# Patient Record
Sex: FEMALE | Race: ASIAN | Employment: STUDENT | URBAN - METROPOLITAN AREA
[De-identification: names, ages, dates, MRNs, and addresses within clinical notes are randomized per-mention and may not be internally consistent; named-entity substitution may affect disease eponyms.]

---

## 2021-06-16 ENCOUNTER — LAB ENCOUNTER (OUTPATIENT)
Dept: LAB | Age: 26
End: 2021-06-16
Attending: INTERNAL MEDICINE
Payer: COMMERCIAL

## 2021-06-16 ENCOUNTER — OFFICE VISIT (OUTPATIENT)
Dept: INTERNAL MEDICINE CLINIC | Facility: CLINIC | Age: 26
End: 2021-06-16
Payer: COMMERCIAL

## 2021-06-16 VITALS
SYSTOLIC BLOOD PRESSURE: 114 MMHG | BODY MASS INDEX: 29.17 KG/M2 | HEIGHT: 64 IN | HEART RATE: 90 BPM | DIASTOLIC BLOOD PRESSURE: 74 MMHG | WEIGHT: 170.88 LBS

## 2021-06-16 DIAGNOSIS — F41.9 ANXIETY AND DEPRESSION: ICD-10-CM

## 2021-06-16 DIAGNOSIS — L30.9 ECZEMA, UNSPECIFIED TYPE: ICD-10-CM

## 2021-06-16 DIAGNOSIS — F32.A ANXIETY AND DEPRESSION: ICD-10-CM

## 2021-06-16 DIAGNOSIS — Z00.00 ANNUAL PHYSICAL EXAM: Primary | ICD-10-CM

## 2021-06-16 DIAGNOSIS — Z00.00 ANNUAL PHYSICAL EXAM: ICD-10-CM

## 2021-06-16 PROCEDURE — 84443 ASSAY THYROID STIM HORMONE: CPT

## 2021-06-16 PROCEDURE — 80061 LIPID PANEL: CPT

## 2021-06-16 PROCEDURE — 90471 IMMUNIZATION ADMIN: CPT | Performed by: INTERNAL MEDICINE

## 2021-06-16 PROCEDURE — 3078F DIAST BP <80 MM HG: CPT | Performed by: INTERNAL MEDICINE

## 2021-06-16 PROCEDURE — 36415 COLL VENOUS BLD VENIPUNCTURE: CPT

## 2021-06-16 PROCEDURE — 90715 TDAP VACCINE 7 YRS/> IM: CPT | Performed by: INTERNAL MEDICINE

## 2021-06-16 PROCEDURE — 3074F SYST BP LT 130 MM HG: CPT | Performed by: INTERNAL MEDICINE

## 2021-06-16 PROCEDURE — 99385 PREV VISIT NEW AGE 18-39: CPT | Performed by: INTERNAL MEDICINE

## 2021-06-16 PROCEDURE — 3008F BODY MASS INDEX DOCD: CPT | Performed by: INTERNAL MEDICINE

## 2021-06-16 PROCEDURE — 82306 VITAMIN D 25 HYDROXY: CPT

## 2021-06-16 PROCEDURE — 80053 COMPREHEN METABOLIC PANEL: CPT

## 2021-06-16 PROCEDURE — 85025 COMPLETE CBC W/AUTO DIFF WBC: CPT

## 2021-06-16 NOTE — PROGRESS NOTES
HPI/Subjective:     Patient ID: Jerri Meyers is a 27year old female. Patient presents today for her physical       History/Other:   Review of Systems   Constitutional: Negative. HENT: Negative. Eyes: Negative. Respiratory: Negative.     Susan Flores Conjunctiva/sclera: Conjunctivae normal.   Neck:      Thyroid: No thyroid mass, thyromegaly or thyroid tenderness. Cardiovascular:      Rate and Rhythm: Normal rate and regular rhythm. Heart sounds: Normal heart sounds. No murmur heard.      Pulmonar had her papsmear last year.     (L30.9) Eczema, unspecified type  Plan: mild and only noted on her fingers, continue use steroid lotion.     (F41.9,  F32.9) Anxiety and depression  Plan: pt states she had been dealing with her anxiety for few years and had

## 2021-06-19 ENCOUNTER — TELEPHONE (OUTPATIENT)
Dept: INTERNAL MEDICINE CLINIC | Facility: CLINIC | Age: 26
End: 2021-06-19

## 2021-06-19 DIAGNOSIS — E78.2 MIXED HYPERLIPIDEMIA: Primary | ICD-10-CM

## 2022-10-11 ENCOUNTER — LAB ENCOUNTER (OUTPATIENT)
Dept: LAB | Age: 27
End: 2022-10-11
Attending: INTERNAL MEDICINE
Payer: COMMERCIAL

## 2022-10-11 ENCOUNTER — OFFICE VISIT (OUTPATIENT)
Dept: INTERNAL MEDICINE CLINIC | Facility: CLINIC | Age: 27
End: 2022-10-11
Payer: COMMERCIAL

## 2022-10-11 VITALS
HEART RATE: 94 BPM | WEIGHT: 191.31 LBS | TEMPERATURE: 98 F | BODY MASS INDEX: 32.66 KG/M2 | DIASTOLIC BLOOD PRESSURE: 66 MMHG | OXYGEN SATURATION: 97 % | SYSTOLIC BLOOD PRESSURE: 120 MMHG | HEIGHT: 64 IN

## 2022-10-11 DIAGNOSIS — R07.2 PRECORDIAL PAIN: ICD-10-CM

## 2022-10-11 DIAGNOSIS — Z00.00 ANNUAL PHYSICAL EXAM: Primary | ICD-10-CM

## 2022-10-11 DIAGNOSIS — Z00.00 ANNUAL PHYSICAL EXAM: ICD-10-CM

## 2022-10-11 DIAGNOSIS — F41.9 ANXIETY: ICD-10-CM

## 2022-10-11 LAB
ALBUMIN SERPL-MCNC: 4.2 G/DL (ref 3.4–5)
ALBUMIN/GLOB SERPL: 1.2 {RATIO} (ref 1–2)
ALP LIVER SERPL-CCNC: 61 U/L
ALT SERPL-CCNC: 22 U/L
ANION GAP SERPL CALC-SCNC: 8 MMOL/L (ref 0–18)
AST SERPL-CCNC: 13 U/L (ref 15–37)
BASOPHILS # BLD AUTO: 0.05 X10(3) UL (ref 0–0.2)
BASOPHILS NFR BLD AUTO: 0.7 %
BILIRUB SERPL-MCNC: 0.5 MG/DL (ref 0.1–2)
BUN BLD-MCNC: 19 MG/DL (ref 7–18)
BUN/CREAT SERPL: 25.7 (ref 10–20)
CALCIUM BLD-MCNC: 9.4 MG/DL (ref 8.5–10.1)
CHLORIDE SERPL-SCNC: 108 MMOL/L (ref 98–112)
CHOLEST SERPL-MCNC: 272 MG/DL (ref ?–200)
CO2 SERPL-SCNC: 24 MMOL/L (ref 21–32)
CREAT BLD-MCNC: 0.74 MG/DL
DEPRECATED RDW RBC AUTO: 41.5 FL (ref 35.1–46.3)
EOSINOPHIL # BLD AUTO: 0.26 X10(3) UL (ref 0–0.7)
EOSINOPHIL NFR BLD AUTO: 3.6 %
ERYTHROCYTE [DISTWIDTH] IN BLOOD BY AUTOMATED COUNT: 12.7 % (ref 11–15)
FASTING PATIENT LIPID ANSWER: YES
FASTING STATUS PATIENT QL REPORTED: YES
GFR SERPLBLD BASED ON 1.73 SQ M-ARVRAT: 115 ML/MIN/1.73M2 (ref 60–?)
GLOBULIN PLAS-MCNC: 3.6 G/DL (ref 2.8–4.4)
GLUCOSE BLD-MCNC: 90 MG/DL (ref 70–99)
HCT VFR BLD AUTO: 41.1 %
HDLC SERPL-MCNC: 46 MG/DL (ref 40–59)
HGB BLD-MCNC: 13.6 G/DL
IMM GRANULOCYTES # BLD AUTO: 0.01 X10(3) UL (ref 0–1)
IMM GRANULOCYTES NFR BLD: 0.1 %
LDLC SERPL CALC-MCNC: 195 MG/DL (ref ?–100)
LYMPHOCYTES # BLD AUTO: 2.14 X10(3) UL (ref 1–4)
LYMPHOCYTES NFR BLD AUTO: 29.8 %
MCH RBC QN AUTO: 29.6 PG (ref 26–34)
MCHC RBC AUTO-ENTMCNC: 33.1 G/DL (ref 31–37)
MCV RBC AUTO: 89.3 FL
MONOCYTES # BLD AUTO: 0.44 X10(3) UL (ref 0.1–1)
MONOCYTES NFR BLD AUTO: 6.1 %
NEUTROPHILS # BLD AUTO: 4.29 X10 (3) UL (ref 1.5–7.7)
NEUTROPHILS # BLD AUTO: 4.29 X10(3) UL (ref 1.5–7.7)
NEUTROPHILS NFR BLD AUTO: 59.7 %
NONHDLC SERPL-MCNC: 226 MG/DL (ref ?–130)
OSMOLALITY SERPL CALC.SUM OF ELEC: 292 MOSM/KG (ref 275–295)
PLATELET # BLD AUTO: 222 10(3)UL (ref 150–450)
POTASSIUM SERPL-SCNC: 4.4 MMOL/L (ref 3.5–5.1)
PROT SERPL-MCNC: 7.8 G/DL (ref 6.4–8.2)
RBC # BLD AUTO: 4.6 X10(6)UL
SODIUM SERPL-SCNC: 140 MMOL/L (ref 136–145)
TRIGL SERPL-MCNC: 164 MG/DL (ref 30–149)
VIT D+METAB SERPL-MCNC: 19.8 NG/ML (ref 30–100)
VLDLC SERPL CALC-MCNC: 35 MG/DL (ref 0–30)
WBC # BLD AUTO: 7.2 X10(3) UL (ref 4–11)

## 2022-10-11 PROCEDURE — 80061 LIPID PANEL: CPT

## 2022-10-11 PROCEDURE — 93000 ELECTROCARDIOGRAM COMPLETE: CPT | Performed by: INTERNAL MEDICINE

## 2022-10-11 PROCEDURE — 36415 COLL VENOUS BLD VENIPUNCTURE: CPT

## 2022-10-11 PROCEDURE — 80053 COMPREHEN METABOLIC PANEL: CPT

## 2022-10-11 PROCEDURE — 85025 COMPLETE CBC W/AUTO DIFF WBC: CPT

## 2022-10-11 PROCEDURE — 3074F SYST BP LT 130 MM HG: CPT | Performed by: INTERNAL MEDICINE

## 2022-10-11 PROCEDURE — 3078F DIAST BP <80 MM HG: CPT | Performed by: INTERNAL MEDICINE

## 2022-10-11 PROCEDURE — 99395 PREV VISIT EST AGE 18-39: CPT | Performed by: INTERNAL MEDICINE

## 2022-10-11 PROCEDURE — 3008F BODY MASS INDEX DOCD: CPT | Performed by: INTERNAL MEDICINE

## 2022-10-11 PROCEDURE — 82306 VITAMIN D 25 HYDROXY: CPT

## 2022-10-11 RX ORDER — ALPRAZOLAM 0.25 MG/1
0.25 TABLET ORAL NIGHTLY PRN
Qty: 14 TABLET | Refills: 0 | Status: SHIPPED | OUTPATIENT
Start: 2022-10-11

## 2022-12-09 RX ORDER — ALPRAZOLAM 0.25 MG/1
0.25 TABLET ORAL NIGHTLY PRN
Qty: 14 TABLET | Refills: 0 | Status: SHIPPED | OUTPATIENT
Start: 2022-12-09

## 2022-12-09 NOTE — TELEPHONE ENCOUNTER
Routing to PCP without review, due to high volumes and staffing issues. Dr Chin Peters (on call-on behalf of Dr Princess Cross )       [address] message sent to schedule a follow up appointment to discuss medication dose increase. Ashutosh Barrett No future appointments.

## 2022-12-09 NOTE — TELEPHONE ENCOUNTER
Approve x 1 as doctor on call.  ANy questions about changing the dose should wait until Dr Judith Leavitt returns

## 2022-12-10 NOTE — TELEPHONE ENCOUNTER
Patient has read [employer] on 12/9/22    Medication Renewal Request  Message [id number]  From   Marizol Simmons RN To   Vito Perez and Delivered   12/9/2022 12:54 PM   Last Read in [address]   12/9/2022 10:46 PM by Lien Bradley         No future appointments.

## 2022-12-26 ENCOUNTER — PATIENT MESSAGE (OUTPATIENT)
Dept: INTERNAL MEDICINE CLINIC | Facility: CLINIC | Age: 27
End: 2022-12-26

## 2022-12-27 NOTE — TELEPHONE ENCOUNTER
From: Alex Rudolph  To: Daisy Smiley MD  Sent: 12/26/2022 9:33 PM CST  Subject: Prescription Questions    Luke Champion,    I wanted to ask if there is a way to re-prescribe 4 pills of the extra strength vitamin D. The insurance split the prescription into two bottles, and I lost one while traveling this past month. I also wanted to ask about the anxiety pills. 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 The pills do not last the whole day before I feel the symptoms of anxiety. I have been taking a second pill whenever that happens. Is that ok to do or should I try a higher dosage? Thanks! Happy holidays!

## 2022-12-29 RX ORDER — ERGOCALCIFEROL 1.25 MG/1
50000 CAPSULE ORAL WEEKLY
Qty: 12 CAPSULE | Refills: 0 | Status: SHIPPED | OUTPATIENT
Start: 2022-12-29 | End: 2023-01-28

## 2023-02-22 RX ORDER — ALPRAZOLAM 0.25 MG/1
TABLET ORAL
Qty: 14 TABLET | Refills: 0 | Status: SHIPPED | OUTPATIENT
Start: 2023-02-22

## 2023-03-14 DIAGNOSIS — R79.89 LOW VITAMIN D LEVEL: Primary | ICD-10-CM

## 2023-03-14 NOTE — TELEPHONE ENCOUNTER
Please review refill protocol failed/ no protocol  Requested Prescriptions   Pending Prescriptions Disp Refills    ERGOCALCIFEROL 1.25 MG (02497 UT) Oral Cap [Pharmacy Med Name: Vitamin D 50,000 Unit Cap Bion] 12 capsule 0     Sig: Take 1 capsule (50,000 Units total) by mouth once a week.        There is no refill protocol information for this order

## 2023-03-15 RX ORDER — ERGOCALCIFEROL 1.25 MG/1
CAPSULE ORAL
Qty: 12 CAPSULE | Refills: 3 | OUTPATIENT
Start: 2023-03-15

## 2023-03-15 NOTE — TELEPHONE ENCOUNTER
Pt needs to do vit D level before we refill; for meantime, should take otc vit D3 at 1000 units po daily

## 2023-03-16 NOTE — TELEPHONE ENCOUNTER
Patient returned our call ( Identified name and  )     Lab order placed for VitaminD , patient it out of town, will have  lab done when  she returns to [facility]

## 2023-03-16 NOTE — TELEPHONE ENCOUNTER
Patient calling ( identified name and  ) needs refills of Alprazolam   As [facility] is our of stock of 0.25mg dose     [facility] instructed the patient to  call PCP     Medication pended for your review / approval to take half tablet of  dose of 0.5mg ( which in stock )

## 2023-03-19 RX ORDER — ALPRAZOLAM 0.5 MG/1
TABLET ORAL
Qty: 15 TABLET | Refills: 0 | Status: SHIPPED | OUTPATIENT
Start: 2023-03-19

## 2023-10-26 ENCOUNTER — OFFICE VISIT (OUTPATIENT)
Dept: INTERNAL MEDICINE CLINIC | Facility: CLINIC | Age: 28
End: 2023-10-26

## 2023-10-26 ENCOUNTER — LAB ENCOUNTER (OUTPATIENT)
Dept: LAB | Age: 28
End: 2023-10-26
Attending: INTERNAL MEDICINE

## 2023-10-26 VITALS
OXYGEN SATURATION: 98 % | WEIGHT: 191.19 LBS | TEMPERATURE: 98 F | BODY MASS INDEX: 32.64 KG/M2 | DIASTOLIC BLOOD PRESSURE: 70 MMHG | HEART RATE: 86 BPM | HEIGHT: 64 IN | SYSTOLIC BLOOD PRESSURE: 124 MMHG

## 2023-10-26 DIAGNOSIS — F41.9 ANXIETY: ICD-10-CM

## 2023-10-26 DIAGNOSIS — E55.9 VITAMIN D DEFICIENCY: ICD-10-CM

## 2023-10-26 DIAGNOSIS — Z00.00 ANNUAL PHYSICAL EXAM: Primary | ICD-10-CM

## 2023-10-26 DIAGNOSIS — M67.432 GANGLION CYST OF WRIST, LEFT: ICD-10-CM

## 2023-10-26 DIAGNOSIS — Z00.00 ANNUAL PHYSICAL EXAM: ICD-10-CM

## 2023-10-26 DIAGNOSIS — L30.9 ECZEMA, UNSPECIFIED TYPE: ICD-10-CM

## 2023-10-26 DIAGNOSIS — R61 HYPERHIDROSIS: ICD-10-CM

## 2023-10-26 DIAGNOSIS — E78.00 HYPERCHOLESTEREMIA: ICD-10-CM

## 2023-10-26 DIAGNOSIS — E28.2 PCOS (POLYCYSTIC OVARIAN SYNDROME): ICD-10-CM

## 2023-10-26 LAB
ALBUMIN SERPL-MCNC: 4 G/DL (ref 3.4–5)
ALBUMIN/GLOB SERPL: 1 {RATIO} (ref 1–2)
ALP LIVER SERPL-CCNC: 66 U/L
ALT SERPL-CCNC: 20 U/L
ANION GAP SERPL CALC-SCNC: 6 MMOL/L (ref 0–18)
AST SERPL-CCNC: 15 U/L (ref 15–37)
BASOPHILS # BLD AUTO: 0.06 X10(3) UL (ref 0–0.2)
BASOPHILS NFR BLD AUTO: 0.7 %
BILIRUB SERPL-MCNC: 0.4 MG/DL (ref 0.1–2)
BUN BLD-MCNC: 18 MG/DL (ref 7–18)
BUN/CREAT SERPL: 23.4 (ref 10–20)
CALCIUM BLD-MCNC: 9.1 MG/DL (ref 8.5–10.1)
CHLORIDE SERPL-SCNC: 109 MMOL/L (ref 98–112)
CHOLEST SERPL-MCNC: 277 MG/DL (ref ?–200)
CO2 SERPL-SCNC: 24 MMOL/L (ref 21–32)
CREAT BLD-MCNC: 0.77 MG/DL
DEPRECATED RDW RBC AUTO: 40.6 FL (ref 35.1–46.3)
EGFRCR SERPLBLD CKD-EPI 2021: 109 ML/MIN/1.73M2 (ref 60–?)
EOSINOPHIL # BLD AUTO: 0.29 X10(3) UL (ref 0–0.7)
EOSINOPHIL NFR BLD AUTO: 3.4 %
ERYTHROCYTE [DISTWIDTH] IN BLOOD BY AUTOMATED COUNT: 12.5 % (ref 11–15)
FASTING PATIENT LIPID ANSWER: YES
FASTING STATUS PATIENT QL REPORTED: YES
GLOBULIN PLAS-MCNC: 3.9 G/DL (ref 2.8–4.4)
GLUCOSE BLD-MCNC: 84 MG/DL (ref 70–99)
HCT VFR BLD AUTO: 42.3 %
HDLC SERPL-MCNC: 44 MG/DL (ref 40–59)
HGB BLD-MCNC: 14 G/DL
IMM GRANULOCYTES # BLD AUTO: 0.02 X10(3) UL (ref 0–1)
IMM GRANULOCYTES NFR BLD: 0.2 %
LDLC SERPL CALC-MCNC: 183 MG/DL (ref ?–100)
LYMPHOCYTES # BLD AUTO: 2.46 X10(3) UL (ref 1–4)
LYMPHOCYTES NFR BLD AUTO: 28.7 %
MCH RBC QN AUTO: 29.4 PG (ref 26–34)
MCHC RBC AUTO-ENTMCNC: 33.1 G/DL (ref 31–37)
MCV RBC AUTO: 88.7 FL
MONOCYTES # BLD AUTO: 0.54 X10(3) UL (ref 0.1–1)
MONOCYTES NFR BLD AUTO: 6.3 %
NEUTROPHILS # BLD AUTO: 5.19 X10 (3) UL (ref 1.5–7.7)
NEUTROPHILS # BLD AUTO: 5.19 X10(3) UL (ref 1.5–7.7)
NEUTROPHILS NFR BLD AUTO: 60.7 %
NONHDLC SERPL-MCNC: 233 MG/DL (ref ?–130)
OSMOLALITY SERPL CALC.SUM OF ELEC: 289 MOSM/KG (ref 275–295)
PLATELET # BLD AUTO: 243 10(3)UL (ref 150–450)
POTASSIUM SERPL-SCNC: 4.4 MMOL/L (ref 3.5–5.1)
PROT SERPL-MCNC: 7.9 G/DL (ref 6.4–8.2)
RBC # BLD AUTO: 4.77 X10(6)UL
SODIUM SERPL-SCNC: 139 MMOL/L (ref 136–145)
TRIGL SERPL-MCNC: 259 MG/DL (ref 30–149)
TSI SER-ACNC: 0.74 MIU/ML (ref 0.36–3.74)
VIT D+METAB SERPL-MCNC: 21 NG/ML (ref 30–100)
VLDLC SERPL CALC-MCNC: 54 MG/DL (ref 0–30)
WBC # BLD AUTO: 8.6 X10(3) UL (ref 4–11)

## 2023-10-26 PROCEDURE — 3074F SYST BP LT 130 MM HG: CPT | Performed by: INTERNAL MEDICINE

## 2023-10-26 PROCEDURE — 84443 ASSAY THYROID STIM HORMONE: CPT

## 2023-10-26 PROCEDURE — 80061 LIPID PANEL: CPT

## 2023-10-26 PROCEDURE — 99395 PREV VISIT EST AGE 18-39: CPT | Performed by: INTERNAL MEDICINE

## 2023-10-26 PROCEDURE — 3008F BODY MASS INDEX DOCD: CPT | Performed by: INTERNAL MEDICINE

## 2023-10-26 PROCEDURE — 80053 COMPREHEN METABOLIC PANEL: CPT

## 2023-10-26 PROCEDURE — 3078F DIAST BP <80 MM HG: CPT | Performed by: INTERNAL MEDICINE

## 2023-10-26 PROCEDURE — 36415 COLL VENOUS BLD VENIPUNCTURE: CPT

## 2023-10-26 PROCEDURE — 85025 COMPLETE CBC W/AUTO DIFF WBC: CPT

## 2023-10-26 PROCEDURE — 82306 VITAMIN D 25 HYDROXY: CPT

## 2023-10-26 RX ORDER — TRIAMCINOLONE ACETONIDE 1 MG/G
CREAM TOPICAL 2 TIMES DAILY PRN
Qty: 60 G | Refills: 1 | Status: SHIPPED | OUTPATIENT
Start: 2023-10-26

## 2023-10-26 RX ORDER — ALPRAZOLAM 0.5 MG/1
TABLET ORAL
Qty: 15 TABLET | Refills: 0 | Status: SHIPPED | OUTPATIENT
Start: 2023-10-26

## 2023-10-26 NOTE — PROGRESS NOTES
Subjective:     Patient ID: Eric Brandon is a 34year old female. Patient presents today for annual physical.    Cyst  This is a new problem. The current episode started more than 1 month ago. The problem occurs constantly. The problem has been unchanged. Pertinent negatives include no chest pain. Nothing aggravates the symptoms. She has tried nothing for the symptoms. The treatment provided no relief. History/Other:   Review of Systems   Constitutional: Negative. HENT: Negative. Eyes: Negative. Respiratory: Negative. Cardiovascular: Negative. Negative for chest pain and palpitations. Works out 5 times a week   Gastrointestinal: Negative. Genitourinary: Negative. Allergic/Immunologic: Positive for environmental allergies and food allergies. Negative for immunocompromised state. Neurological:  Negative for syncope. Hematological: Negative. Current Outpatient Medications   Medication Sig Dispense Refill    ALPRAZolam 0.5 MG Oral Tab Take half tablet  ( 0.25mg )  By mouth nightly as need for anxiety (Patient not taking: Reported on 10/26/2023) 15 tablet 0     Allergies:  Penicillins                 Past Medical History:   Diagnosis Date    Anxiety     Eczema     Hypercholesterolemia     PCOS (polycystic ovarian syndrome)       History reviewed. No pertinent surgical history. Family History   Problem Relation Age of Onset    Hypertension Father     Lipids Father     No Known Problems Mother     No Known Problems Brother       Social History:   Social History     Socioeconomic History    Marital status: Single   Tobacco Use    Smoking status: Never    Smokeless tobacco: Never   Vaping Use    Vaping Use: Some days   Substance and Sexual Activity    Alcohol use:  Yes     Alcohol/week: 0.0 standard drinks of alcohol     Comment: occasiona    Drug use: No     Comment: takes cbd for her anxeity        Objective:   Physical Exam  Constitutional:       General: She is not in acute distress. Appearance: She is well-developed. She is obese. She is not ill-appearing, toxic-appearing or diaphoretic. HENT:      Head: Normocephalic and atraumatic. Right Ear: Tympanic membrane, ear canal and external ear normal.      Left Ear: Tympanic membrane, ear canal and external ear normal.      Nose: Nose normal.      Mouth/Throat:      Pharynx: No oropharyngeal exudate. Eyes:      General:         Right eye: No discharge. Left eye: No discharge. Conjunctiva/sclera: Conjunctivae normal.      Pupils: Pupils are equal, round, and reactive to light. Neck:      Vascular: No carotid bruit or JVD. Cardiovascular:      Rate and Rhythm: Normal rate and regular rhythm. Heart sounds: Normal heart sounds. No murmur heard. Pulmonary:      Effort: Pulmonary effort is normal. No respiratory distress. Breath sounds: Normal breath sounds. No wheezing or rales. Abdominal:      General: Bowel sounds are normal. There is no distension. Palpations: Abdomen is soft. There is no mass. Tenderness: There is no abdominal tenderness. There is no guarding or rebound. Musculoskeletal:         General: No tenderness. Normal range of motion. Cervical back: Normal range of motion and neck supple. No rigidity or tenderness. Right lower leg: No edema. Left lower leg: No edema. Comments: Ganglion cyst 1cm on the vental aspect of the left wrist laterally; not red; not tender   Lymphadenopathy:      Cervical: No cervical adenopathy. Skin:     General: Skin is warm and dry. Findings: No rash. Neurological:      Mental Status: She is alert and oriented to person, place, and time. Assessment & Plan:   (Z00.00) Annual physical exam  (primary encounter diagnosis)  Plan: CBC With Differential With Platelet, Comp         Metabolic Panel (14), TSH W Reflex To Free T4,         Lipid Panel, Vitamin D        Routine labs been ordered.   The patient ready had a flu shot from his work.    (E55.9) Vitamin D deficiency  Plan: Vitamin D        We will check her vitamin D level. Continue vitamin D supplement.    (E28.2) PCOS (polycystic ovarian syndrome)  Plan: OBG Referral - In Network        Patient reminded to follow-up with gynecologist.    (X40.160) Ganglion cyst of wrist, left  Plan: Ortho Referral - In Network        Referral to Ortho.    (R61) Hyperhidrosis  Plan: Derm Referral - In Network        She complain of excessive sweating in the face the neck as well as her hands. We will refer her to dermatologist.    (L30.9) Eczema, unspecified type  Plan: Derm Referral - In Network        Patient given triamcinolone for now but she will see dermatologist.    (F41.9) Anxiety  Plan: She uses alprazolam sparingly and she asked for refill of. We also talked about seeing psychotherapist and we could refer her to [facility] however she will be going back to [facility] where she stays. She will see a local psychotherapist    (E78.00) Hypercholesteremia  Plan: We will check her lipid panel. She has been exercising regularly and watching her diet and following a low-fat low-cholesterol diet.         Orders Placed This Encounter      CBC With Differential With Platelet      Comp Metabolic Panel (14)      TSH W Reflex To Free T4      Lipid Panel      Vitamin D      Meds This Visit:  Requested Prescriptions     Pending Prescriptions Disp Refills    ALPRAZolam 0.5 MG Oral Tab 15 tablet 0     Sig: Take half tablet  ( 0.25mg )  By mouth nightly as need for anxiety       Imaging & Referrals:  OBG - INTERNAL

## 2023-10-30 ENCOUNTER — TELEPHONE (OUTPATIENT)
Dept: ORTHOPEDICS CLINIC | Facility: CLINIC | Age: 28
End: 2023-10-30

## 2023-10-30 ENCOUNTER — OFFICE VISIT (OUTPATIENT)
Dept: ORTHOPEDICS CLINIC | Facility: CLINIC | Age: 28
End: 2023-10-30

## 2023-10-30 VITALS — BODY MASS INDEX: 32.61 KG/M2 | HEIGHT: 64 IN | WEIGHT: 191 LBS

## 2023-10-30 DIAGNOSIS — M67.432 GANGLION OF LEFT WRIST: Primary | ICD-10-CM

## 2023-10-30 PROCEDURE — 99204 OFFICE O/P NEW MOD 45 MIN: CPT | Performed by: ORTHOPAEDIC SURGERY

## 2023-10-30 PROCEDURE — 3008F BODY MASS INDEX DOCD: CPT | Performed by: ORTHOPAEDIC SURGERY

## 2023-10-30 NOTE — TELEPHONE ENCOUNTER
Date of Surgery: 24       Post Op Appt:      Case ID: [id number]    Notes: NEEDS TO BE CALLED TO DISCUSS SURGICAL PROTOCOL AND MAKE POST OP APPT.         SURGICAL BOOKING SHEET   Name: Denisse Collins  MRN: [id number]   : 3/9/1995     Surgical Date:    24   Surgical Consent:    Excision of left volar ganglion cyst   Diagnosis:     (M67.432) Ganglion of left wrist  (primary encounter diagnosis)    Procedure Codes:    Excision of ganglion cyst (75410)   Disposition:    Outpatient   Operative Time:    15 mins   Antibiotics:    Ancef 2g   Anesthesia Type:    Brennan Block + MAC   Clearance:     NONE   Equipment:    GC Excision: [place] Blade, 4-0 Monocryl, 0.5% Marcaine (on field), Exofin, Telfa+Tegaderm    Assistant:    Bob Assistant: Yes, Dulce Nair PA-C   Follow Up:    7-10 days post op with Dulce Nair   Pain Medication:    Norco 325-5mg   Therapy:    None

## 2023-12-23 NOTE — TELEPHONE ENCOUNTER
Please review. Protocol failed or has no protocol.    ALPRAZolam 0.5 MG Oral Tab [Elan Nicolasladi]      Patient Comment: I have been taking 1mg to prevent panic attacks for social events  Requested Prescriptions   Pending Prescriptions Disp Refills    ALPRAZolam 0.5 MG Oral Tab 15 tablet 0     Sig: Take half tablet  ( 0.25mg )  By mouth nightly as need for anxiety       There is no refill protocol information for this order          Recent Outpatient Visits              1 month ago Ganglion of left wrist    [facility], [address], Noel Denny MD    Office Visit    1 month ago Annual physical exam    [address] [facility], Jermaine Salmeron MD    Office Visit    1 year ago Annual physical exam    [facility], [address], Jermaine Salmeron MD    Office Visit    2 years ago Annual physical exam    [address] [facility], Jermaine Salmeron MD    Office Visit    4 years ago Dyshidrotic eczema    Dermatology - Reina Ruiz MD    Office Visit

## 2023-12-24 RX ORDER — ALPRAZOLAM 0.5 MG/1
TABLET ORAL
Qty: 30 TABLET | Refills: 0 | Status: SHIPPED | OUTPATIENT
Start: 2023-12-24

## 2024-01-09 NOTE — TELEPHONE ENCOUNTER
Please review. Protocol failed or has no protocol.    Requested Prescriptions   Pending Prescriptions Disp Refills    ALPRAZolam 0.5 MG Oral Tab 30 tablet 0     Sig: Take 1tablet by mouth nightly as need for anxiety       There is no refill protocol information for this order          Recent Outpatient Visits              2 months ago Ganglion of left wrist    [facility], [address] [place] [address] Elia Rojas MD    Office Visit    2 months ago Annual physical exam    [facility] Elan Bird MD    Office Visit    1 year ago Annual physical exam    [facility] Elan Newsome MD    Office Visit    2 years ago Annual physical exam    [facility] Elna Newsome MD    Office Visit    4 years ago Dyshidrotic eczema    Dermatology - [facility] Jasmina Juarez MD    Office Visit

## 2024-01-10 RX ORDER — ALPRAZOLAM 0.5 MG/1
TABLET ORAL
Qty: 30 TABLET | Refills: 0 | OUTPATIENT
Start: 2024-01-10

## 2024-01-11 RX ORDER — ALPRAZOLAM 0.5 MG/1
TABLET ORAL
Qty: 30 TABLET | Refills: 0 | Status: CANCELLED | OUTPATIENT
Start: 2024-01-11

## 2024-01-11 NOTE — TELEPHONE ENCOUNTER
Future Appointments   Date Time Provider Department Center   2/12/2024  9:15 AM Elan Bird MD [facility]     Pt scheduled, [place] requesting refill to get to appointment date.

## 2024-01-11 NOTE — TELEPHONE ENCOUNTER
efusal reason: Appt required, please call patient     [employer] message sent to patient to schedule an office visit with PCP.   Please make a phone attempt.

## 2024-03-25 ENCOUNTER — TELEPHONE (OUTPATIENT)
Dept: ORTHOPEDICS CLINIC | Facility: CLINIC | Age: 29
End: 2024-03-25

## 2024-03-25 DIAGNOSIS — M67.432 GANGLION OF LEFT WRIST: Primary | ICD-10-CM

## 2024-05-08 ENCOUNTER — OFFICE VISIT (OUTPATIENT)
Dept: INTERNAL MEDICINE CLINIC | Facility: CLINIC | Age: 29
End: 2024-05-08
Payer: COMMERCIAL

## 2024-05-08 VITALS
HEIGHT: 64 IN | HEART RATE: 88 BPM | SYSTOLIC BLOOD PRESSURE: 120 MMHG | BODY MASS INDEX: 33.48 KG/M2 | TEMPERATURE: 98 F | OXYGEN SATURATION: 96 % | WEIGHT: 196.13 LBS | DIASTOLIC BLOOD PRESSURE: 74 MMHG

## 2024-05-08 DIAGNOSIS — E55.9 VITAMIN D DEFICIENCY: ICD-10-CM

## 2024-05-08 DIAGNOSIS — E28.2 PCOS (POLYCYSTIC OVARIAN SYNDROME): ICD-10-CM

## 2024-05-08 DIAGNOSIS — Z00.00 ANNUAL PHYSICAL EXAM: Primary | ICD-10-CM

## 2024-05-08 DIAGNOSIS — E78.00 HYPERCHOLESTEREMIA: ICD-10-CM

## 2024-05-08 DIAGNOSIS — F41.9 ANXIETY: ICD-10-CM

## 2024-05-08 PROCEDURE — 99395 PREV VISIT EST AGE 18-39: CPT | Performed by: INTERNAL MEDICINE

## 2024-05-08 RX ORDER — GLYCOPYRROLATE 1 MG/1
1 TABLET ORAL 3 TIMES DAILY
COMMUNITY

## 2024-05-08 RX ORDER — DEXAMETHASONE 1 MG
TABLET ORAL
Qty: 1 TABLET | Refills: 0 | Status: SHIPPED | OUTPATIENT
Start: 2024-05-08

## 2024-05-08 RX ORDER — ALPRAZOLAM 0.5 MG/1
TABLET ORAL
Qty: 30 TABLET | Refills: 0 | Status: SHIPPED | OUTPATIENT
Start: 2024-05-08

## 2024-05-08 NOTE — PROGRESS NOTES
Subjective:     Patient ID: Denisse Collins is a 29 year old female.    Patient presents today for her annual physical. She had mentioned about having fat in the back of neck for years that has not been going away.         History/Other:   Review of Systems   Constitutional: Negative.    HENT: Negative.     Eyes: Negative.    Respiratory: Negative.          No hemoptysis   Cardiovascular: Negative.  Negative for chest pain, palpitations and leg swelling.        Works out daily ; no pnd   Gastrointestinal: Negative.         No hematemesis   Genitourinary:  Positive for menstrual problem. Negative for dysuria, frequency and hematuria.   Allergic/Immunologic: Positive for food allergies. Negative for immunocompromised state.   Neurological:  Negative for syncope.   Hematological: Negative.      Current Outpatient Medications   Medication Sig Dispense Refill    glycopyrrolate 1 MG Oral Tab Take 1 tablet (1 mg total) by mouth 3 (three) times daily.      ALPRAZolam 0.5 MG Oral Tab Take 1tablet by mouth nightly as need for anxiety 30 tablet 0    triamcinolone 0.1 % External Cream Apply topically 2 (two) times daily as needed. 60 g 1     Allergies:  Allergies   Allergen Reactions    Penicillins        Past Medical History:    Anxiety    Eczema    Hypercholesterolemia    PCOS (polycystic ovarian syndrome)      History reviewed. No pertinent surgical history.   Family History   Problem Relation Age of Onset    Hypertension Father     Lipids Father     No Known Problems Mother     No Known Problems Brother       Social History:   Social History     Socioeconomic History    Marital status: Single   Tobacco Use    Smoking status: Never     Passive exposure: Never    Smokeless tobacco: Never   Vaping Use    Vaping status: Some Days   Substance and Sexual Activity    Alcohol use: Yes     Alcohol/week: 0.0 standard drinks of alcohol     Comment: occasiona    Drug use: No     Comment: takes cbd for her anxeity        Objective:    Physical Exam  Constitutional:       General: She is not in acute distress.     Appearance: She is well-developed. She is obese. She is not ill-appearing, toxic-appearing or diaphoretic.   HENT:      Head: Normocephalic and atraumatic.      Right Ear: Tympanic membrane, ear canal and external ear normal.      Left Ear: Tympanic membrane, ear canal and external ear normal.      Nose: Nose normal.      Mouth/Throat:      Pharynx: No oropharyngeal exudate.   Eyes:      General:         Right eye: No discharge.         Left eye: No discharge.      Conjunctiva/sclera: Conjunctivae normal.      Pupils: Pupils are equal, round, and reactive to light.   Neck:      Vascular: No JVD.      Comments: ?buffalo hump lower cervical spine  Cardiovascular:      Rate and Rhythm: Normal rate and regular rhythm.      Pulses: Normal pulses.      Heart sounds: Normal heart sounds. No murmur heard.  Pulmonary:      Effort: Pulmonary effort is normal. No respiratory distress.      Breath sounds: Normal breath sounds. No wheezing or rales.   Abdominal:      General: Bowel sounds are normal. There is no distension.      Palpations: Abdomen is soft. There is no mass.      Tenderness: There is no abdominal tenderness. There is no guarding or rebound.   Musculoskeletal:         General: No tenderness. Normal range of motion.      Cervical back: Normal range of motion and neck supple. No rigidity or tenderness.      Right lower leg: No edema.      Left lower leg: No edema.   Lymphadenopathy:      Cervical: No cervical adenopathy.   Skin:     General: Skin is warm and dry.      Findings: No rash.   Neurological:      Mental Status: She is alert and oriented to person, place, and time.         Assessment & Plan:   (Z00.00) Annual physical exam  (primary encounter diagnosis)  Plan: CBC With Differential With Platelet, Comp         Metabolic Panel (14), Hemoglobin A1C, Lipid         Panel, TSH W Reflex To Free T4, Cortisol [E],         Vitamin D         Routine labs ordered. Pt has apptment to see gyne for her papsmear tomorrow.     (Z68.33) BMI 33.0-33.9,adult  Plan: Cortisol [E]        Pt had been having difficulty of losing weight, on keto diet and exercising daily; she has a localized fat pad on then lower post cervical spine which is suggestive for buffalo hump seen in patient with hypercortisolism or cushings. I told her we will do dexamethasone suppression test to screen for it. If negative then just has localized fat pad on the cervical spine .    (F41.9) Anxiety  Plan: ALPRAZolam 0.5 MG Oral Tab        Refilled her xanax.     (E78.00) Hypercholesteremia  Plan: check lipid panel; continue with low chol diet and exercise.     (E55.9) Vitamin D deficiency  Plan: Vitamin D        Check vit d.     (E28.2) PCOS (polycystic ovarian syndrome)  Plan: had been diagnosed by gyne since 5 yrs ago. She will discuss with her gyne tomorrow.        No orders of the defined types were placed in this encounter.      Meds This Visit:  Requested Prescriptions      No prescriptions requested or ordered in this encounter       Imaging & Referrals:  None

## 2024-05-09 ENCOUNTER — LAB ENCOUNTER (OUTPATIENT)
Dept: LAB | Age: 29
End: 2024-05-09
Attending: INTERNAL MEDICINE
Payer: COMMERCIAL

## 2024-05-09 DIAGNOSIS — Z00.00 ANNUAL PHYSICAL EXAM: ICD-10-CM

## 2024-05-09 DIAGNOSIS — E55.9 VITAMIN D DEFICIENCY: ICD-10-CM

## 2024-05-09 LAB
ALBUMIN SERPL-MCNC: 4.6 G/DL (ref 3.2–4.8)
ALBUMIN/GLOB SERPL: 1.4 {RATIO} (ref 1–2)
ALP LIVER SERPL-CCNC: 65 U/L
ALT SERPL-CCNC: 18 U/L
ANION GAP SERPL CALC-SCNC: 7 MMOL/L (ref 0–18)
AST SERPL-CCNC: 12 U/L (ref ?–34)
BASOPHILS # BLD AUTO: 0.04 X10(3) UL (ref 0–0.2)
BASOPHILS NFR BLD AUTO: 0.5 %
BILIRUB SERPL-MCNC: 0.5 MG/DL (ref 0.3–1.2)
BUN BLD-MCNC: 14 MG/DL (ref 9–23)
BUN/CREAT SERPL: 20.6 (ref 10–20)
CALCIUM BLD-MCNC: 9.4 MG/DL (ref 8.7–10.4)
CHLORIDE SERPL-SCNC: 109 MMOL/L (ref 98–112)
CHOLEST SERPL-MCNC: 305 MG/DL (ref ?–200)
CO2 SERPL-SCNC: 25 MMOL/L (ref 21–32)
CORTIS SERPL-MCNC: 0.9 UG/DL
CREAT BLD-MCNC: 0.68 MG/DL
DEPRECATED RDW RBC AUTO: 39.5 FL (ref 35.1–46.3)
EGFRCR SERPLBLD CKD-EPI 2021: 122 ML/MIN/1.73M2 (ref 60–?)
EOSINOPHIL # BLD AUTO: 0.04 X10(3) UL (ref 0–0.7)
EOSINOPHIL NFR BLD AUTO: 0.5 %
ERYTHROCYTE [DISTWIDTH] IN BLOOD BY AUTOMATED COUNT: 12.5 % (ref 11–15)
EST. AVERAGE GLUCOSE BLD GHB EST-MCNC: 108 MG/DL (ref 68–126)
FASTING PATIENT LIPID ANSWER: YES
FASTING STATUS PATIENT QL REPORTED: YES
GLOBULIN PLAS-MCNC: 3.4 G/DL (ref 2–3.5)
GLUCOSE BLD-MCNC: 100 MG/DL (ref 70–99)
HBA1C MFR BLD: 5.4 % (ref ?–5.7)
HCT VFR BLD AUTO: 42.5 %
HDLC SERPL-MCNC: 48 MG/DL (ref 40–59)
HGB BLD-MCNC: 14 G/DL
IMM GRANULOCYTES # BLD AUTO: 0.02 X10(3) UL (ref 0–1)
IMM GRANULOCYTES NFR BLD: 0.2 %
LDLC SERPL CALC-MCNC: 240 MG/DL (ref ?–100)
LYMPHOCYTES # BLD AUTO: 2.24 X10(3) UL (ref 1–4)
LYMPHOCYTES NFR BLD AUTO: 26 %
MCH RBC QN AUTO: 28.7 PG (ref 26–34)
MCHC RBC AUTO-ENTMCNC: 32.9 G/DL (ref 31–37)
MCV RBC AUTO: 87.3 FL
MONOCYTES # BLD AUTO: 0.24 X10(3) UL (ref 0.1–1)
MONOCYTES NFR BLD AUTO: 2.8 %
NEUTROPHILS # BLD AUTO: 6.05 X10 (3) UL (ref 1.5–7.7)
NEUTROPHILS # BLD AUTO: 6.05 X10(3) UL (ref 1.5–7.7)
NEUTROPHILS NFR BLD AUTO: 70 %
NONHDLC SERPL-MCNC: 257 MG/DL (ref ?–130)
OSMOLALITY SERPL CALC.SUM OF ELEC: 293 MOSM/KG (ref 275–295)
PLATELET # BLD AUTO: 272 10(3)UL (ref 150–450)
POTASSIUM SERPL-SCNC: 4.5 MMOL/L (ref 3.5–5.1)
PROT SERPL-MCNC: 8 G/DL (ref 5.7–8.2)
RBC # BLD AUTO: 4.87 X10(6)UL
SODIUM SERPL-SCNC: 141 MMOL/L (ref 136–145)
T3FREE SERPL-MCNC: 3.12 PG/ML (ref 2.4–4.2)
T4 FREE SERPL-MCNC: 1.2 NG/DL (ref 0.8–1.7)
TRIGL SERPL-MCNC: 99 MG/DL (ref 30–149)
TSI SER-ACNC: 0.49 MIU/ML (ref 0.55–4.78)
VIT D+METAB SERPL-MCNC: 23 NG/ML (ref 30–100)
VLDLC SERPL CALC-MCNC: 23 MG/DL (ref 0–30)
WBC # BLD AUTO: 8.6 X10(3) UL (ref 4–11)

## 2024-05-09 PROCEDURE — 82533 TOTAL CORTISOL: CPT

## 2024-05-09 PROCEDURE — 80053 COMPREHEN METABOLIC PANEL: CPT

## 2024-05-09 PROCEDURE — 36415 COLL VENOUS BLD VENIPUNCTURE: CPT

## 2024-05-09 PROCEDURE — 80061 LIPID PANEL: CPT

## 2024-05-09 PROCEDURE — 82306 VITAMIN D 25 HYDROXY: CPT

## 2024-05-09 PROCEDURE — 84481 FREE ASSAY (FT-3): CPT

## 2024-05-09 PROCEDURE — 84439 ASSAY OF FREE THYROXINE: CPT

## 2024-05-09 PROCEDURE — 83036 HEMOGLOBIN GLYCOSYLATED A1C: CPT

## 2024-05-09 PROCEDURE — 85025 COMPLETE CBC W/AUTO DIFF WBC: CPT

## 2024-05-09 PROCEDURE — 84443 ASSAY THYROID STIM HORMONE: CPT

## 2024-05-12 ENCOUNTER — TELEPHONE (OUTPATIENT)
Dept: INTERNAL MEDICINE CLINIC | Facility: CLINIC | Age: 29
End: 2024-05-12

## 2024-05-12 DIAGNOSIS — E78.00 HYPERCHOLESTEREMIA: ICD-10-CM

## 2024-05-12 DIAGNOSIS — R79.89 ABNORMAL TSH: Primary | ICD-10-CM

## 2024-05-13 ENCOUNTER — OFFICE VISIT (OUTPATIENT)
Facility: CLINIC | Age: 29
End: 2024-05-13
Payer: COMMERCIAL

## 2024-05-13 VITALS
HEIGHT: 64 IN | DIASTOLIC BLOOD PRESSURE: 82 MMHG | BODY MASS INDEX: 33.46 KG/M2 | SYSTOLIC BLOOD PRESSURE: 139 MMHG | WEIGHT: 196 LBS

## 2024-05-13 DIAGNOSIS — E28.2 PCO (POLYCYSTIC OVARIES): Primary | ICD-10-CM

## 2024-05-13 NOTE — PROGRESS NOTES
Gynecology Office Visit      Denisse Collins is a 29 year old female  Patient's last menstrual period was 2024 (approximate). (contraception:none)                                                                                                                                                                                                                                                         HPI:     Chief Complaint   Patient presents with    Consult     Pt wants second opinion on PCOS. Weight gain       Was diagnosed with PCOS at outside facility in [place] in 2019 by ultrasound.  No labs drawn. Had recent TSH drawn and found to be slightly low, but free T4 and Free T3 were normal. Is concerned that this is causing weight gain.  No trying to get pregnant.  Recommended day 3 labs--FSH, LH, Estradiol, and fasting insulin.schedule pelvic ultrasound  All questions answered.    Chart and previous encounters reviewed.  HISTORY:  Past Medical History:    Anxiety    Eczema    Hypercholesterolemia    PCOS (polycystic ovarian syndrome)      No past surgical history on file.   Family History   Problem Relation Age of Onset    Hypertension Father     Lipids Father     No Known Problems Mother     No Known Problems Brother       Social History:   Social History     Socioeconomic History    Marital status: Single   Tobacco Use    Smoking status: Never     Passive exposure: Never    Smokeless tobacco: Never   Vaping Use    Vaping status: Some Days   Substance and Sexual Activity    Alcohol use: Yes     Alcohol/week: 0.0 standard drinks of alcohol     Comment: occasiona    Drug use: No     Comment: takes cbd for her anxeity        Medications (Active prior to today's visit):  Current Outpatient Medications   Medication Sig Dispense Refill    glycopyrrolate 1 MG Oral Tab Take 1 tablet (1 mg total) by mouth 3 (three) times daily.      dexamethasone 1 MG Oral Tab Take 1 tab po 11pm tonight 1 tablet 0    ALPRAZolam  0.5 MG Oral Tab Take 1tablet by mouth nightly as need for anxiety 30 tablet 0    triamcinolone 0.1 % External Cream Apply topically 2 (two) times daily as needed. 60 g 1       Allergies:  Allergies   Allergen Reactions    Penicillins        Gyn:  Menarche: 15  Period Cycle (Days): 21-29  Period Duration (Days): 5  Use of Birth Control (if yes, specify type): None  Pap Date: 24    OB Hx:  OB History    Para Term  AB Living   0 0 0 0 0 0   SAB IAB Ectopic Multiple Live Births   0 0 0 0 0           PHYSICAL EXAM:   /82   Ht 64\"   Wt 196 lb (88.9 kg)   LMP 2024 (Approximate)   BMI 33.64 kg/m²      Wt Readings from Last 6 Encounters:   24 196 lb (88.9 kg)   24 196 lb 1.6 oz (89 kg)   10/30/23 191 lb (86.6 kg)   10/26/23 191 lb 3.2 oz (86.7 kg)   10/11/22 191 lb 4.8 oz (86.8 kg)   21 170 lb 14.4 oz (77.5 kg)        No exam done today       ASSESSMENT/PLAN:     1. PCO (polycystic ovaries)        No exam done today  Meds This Visit:  Requested Prescriptions      No prescriptions requested or ordered in this encounter       Imaging & Referrals:  None     No follow-ups on file.  Visit time 20 min    Dagoberto Chou MD  2024  11:41 AM

## 2024-05-14 ENCOUNTER — PATIENT MESSAGE (OUTPATIENT)
Dept: INTERNAL MEDICINE CLINIC | Facility: CLINIC | Age: 29
End: 2024-05-14

## 2024-05-15 NOTE — TELEPHONE ENCOUNTER
From: Denisse Collins  To: Elan Bird  Sent: 5/14/2024 11:18 PM CDT  Subject: Question about a Referral    Hi Dr Bird,    I received a phone call from Troy Cardio to do a test based on your referral. I am unsure why I might need this test, and if it is urgent. Please let me know as soon as possible.     Denisse

## 2024-05-23 ENCOUNTER — PATIENT MESSAGE (OUTPATIENT)
Dept: INTERNAL MEDICINE CLINIC | Facility: CLINIC | Age: 29
End: 2024-05-23

## 2024-05-24 NOTE — TELEPHONE ENCOUNTER
Dr. Bird, do you want patient to schedule an appointment with you to discuss weight loss medications or with a specialist?

## 2024-05-28 NOTE — TELEPHONE ENCOUNTER
We can refer to weight specialist DR Mccarthy or one of provider in clinic that does weight management.

## 2024-06-12 ENCOUNTER — MED REC SCAN ONLY (OUTPATIENT)
Dept: INTERNAL MEDICINE CLINIC | Facility: CLINIC | Age: 29
End: 2024-06-12

## 2024-07-30 ENCOUNTER — TELEPHONE (OUTPATIENT)
Facility: CLINIC | Age: 29
End: 2024-07-30

## 2024-07-30 DIAGNOSIS — E28.2 PCO (POLYCYSTIC OVARIES): Primary | ICD-10-CM

## 2024-08-19 DIAGNOSIS — F41.9 ANXIETY: ICD-10-CM

## 2024-08-21 NOTE — TELEPHONE ENCOUNTER
Please review; protocol failed/ has no protocol        Recent fills: 05/08/2024  Last Rx written: 05/08/2024  Last Office Visit: 05/08/2024    Recent Visits  Date Type Provider Dept   05/08/24 Office Visit Elan Bird MD [facility]-Internal Med         Requested Prescriptions   Pending Prescriptions Disp Refills    ALPRAZolam 0.5 MG Oral Tab 30 tablet 0     Sig: Take 1tablet by mouth nightly as need for anxiety       Controlled Substance Medication Failed - 8/19/2024  1:47 PM        Failed - This medication is a controlled substance - forward to provider to refill           Recent Outpatient Visits              3 months ago PCO (polycystic ovaries)    [facility] Dagoberto Chou MD    Office Visit    3 months ago Annual physical exam    [facility] Elan Bird MD    Office Visit    9 months ago Ganglion of left wrist    [facility], [address] [place] [address] Elia Rojas MD    Office Visit    10 months ago Annual physical exam    [facility] Elan Newsome MD    Office Visit    1 year ago Annual physical exam    [facility] Elan Newsome MD    Office Visit

## 2024-08-23 RX ORDER — ALPRAZOLAM 0.5 MG
TABLET ORAL
Qty: 30 TABLET | Refills: 0 | Status: SHIPPED | OUTPATIENT
Start: 2024-08-23

## 2024-08-29 ENCOUNTER — PATIENT MESSAGE (OUTPATIENT)
Facility: CLINIC | Age: 29
End: 2024-08-29

## 2024-11-21 DIAGNOSIS — F41.9 ANXIETY: ICD-10-CM

## 2024-11-27 RX ORDER — ALPRAZOLAM 0.5 MG
TABLET ORAL
Qty: 30 TABLET | Refills: 0 | Status: SHIPPED | OUTPATIENT
Start: 2024-11-27

## 2024-11-27 NOTE — TELEPHONE ENCOUNTER
Please review; protocol failed/ has no protocol      Please see patients MyChart Message   Denisse RIZO [facility] Refills6 days ago       Refills have been requested for the following medications:         ALPRAZolam 0.5 MG Oral Tab [Elan Bird]      Patient Comment: Dr. Baker, I have just started a new job, the work situation makes me having bad anxiety, could you please refill this medication for me, I am still living in [place], I will go back to [place] at the end of this year. Apprecaite for your help      Recent fills: 08/23/2024  Last Rx written: 08/23/2024  Last Office Visit: 05/08/2024    Recent Visits  Date Type Provider Dept   05/08/24 Office Visit Elan Bird MD [facility]-Internal Med       Requested Prescriptions   Pending Prescriptions Disp Refills    ALPRAZolam 0.5 MG Oral Tab 30 tablet 0     Sig: Take 1tablet by mouth nightly as need for anxiety       Controlled Substance Medication Failed - 11/27/2024  9:31 AM        Failed - This medication is a controlled substance - forward to provider to refill           Recent Outpatient Visits              6 months ago PCO (polycystic ovaries)    [facility] Dagoberto Chou MD    Office Visit    6 months ago Annual physical exam    [facility] Elan Newsome MD    Office Visit    1 year ago Ganglion of left wrist    [address] Elia Rojas MD    Office Visit    1 year ago Annual physical exam    [facility] Elan Newsome MD    Office Visit    2 years ago Annual physical exam    [facility] Elan Newsome MD    Office Visit

## 2025-07-03 ENCOUNTER — LAB ENCOUNTER (OUTPATIENT)
Dept: LAB | Age: 30
End: 2025-07-03
Attending: INTERNAL MEDICINE
Payer: COMMERCIAL

## 2025-07-03 ENCOUNTER — OFFICE VISIT (OUTPATIENT)
Dept: INTERNAL MEDICINE CLINIC | Facility: CLINIC | Age: 30
End: 2025-07-03
Payer: COMMERCIAL

## 2025-07-03 VITALS
HEART RATE: 91 BPM | BODY MASS INDEX: 31.5 KG/M2 | TEMPERATURE: 98 F | WEIGHT: 184.5 LBS | DIASTOLIC BLOOD PRESSURE: 68 MMHG | SYSTOLIC BLOOD PRESSURE: 118 MMHG | HEIGHT: 64 IN | OXYGEN SATURATION: 98 %

## 2025-07-03 DIAGNOSIS — E28.2 PCOS (POLYCYSTIC OVARIAN SYNDROME): ICD-10-CM

## 2025-07-03 DIAGNOSIS — E78.00 HYPERCHOLESTEREMIA: ICD-10-CM

## 2025-07-03 DIAGNOSIS — E55.9 VITAMIN D DEFICIENCY: ICD-10-CM

## 2025-07-03 DIAGNOSIS — R79.89 ABNORMAL TSH: ICD-10-CM

## 2025-07-03 DIAGNOSIS — F41.9 ANXIETY: ICD-10-CM

## 2025-07-03 DIAGNOSIS — Z00.00 ANNUAL PHYSICAL EXAM: Primary | ICD-10-CM

## 2025-07-03 DIAGNOSIS — Z00.00 ANNUAL PHYSICAL EXAM: ICD-10-CM

## 2025-07-03 LAB
ALBUMIN SERPL-MCNC: 4.9 G/DL (ref 3.2–4.8)
ALBUMIN/GLOB SERPL: 1.8 {RATIO} (ref 1–2)
ALP LIVER SERPL-CCNC: 70 U/L (ref 37–98)
ALT SERPL-CCNC: 28 U/L (ref 10–49)
ANION GAP SERPL CALC-SCNC: 7 MMOL/L (ref 0–18)
AST SERPL-CCNC: 29 U/L (ref ?–34)
BASOPHILS # BLD AUTO: 0.04 X10(3) UL (ref 0–0.2)
BASOPHILS NFR BLD AUTO: 0.5 %
BILIRUB SERPL-MCNC: 0.7 MG/DL (ref 0.3–1.2)
BUN BLD-MCNC: 15 MG/DL (ref 9–23)
BUN/CREAT SERPL: 18.8 (ref 10–20)
CALCIUM BLD-MCNC: 9.5 MG/DL (ref 8.7–10.4)
CHLORIDE SERPL-SCNC: 105 MMOL/L (ref 98–112)
CHOLEST SERPL-MCNC: 263 MG/DL (ref ?–200)
CO2 SERPL-SCNC: 25 MMOL/L (ref 21–32)
CREAT BLD-MCNC: 0.8 MG/DL (ref 0.55–1.02)
DEPRECATED RDW RBC AUTO: 41.8 FL (ref 35.1–46.3)
EGFRCR SERPLBLD CKD-EPI 2021: 103 ML/MIN/1.73M2 (ref 60–?)
EOSINOPHIL # BLD AUTO: 0.13 X10(3) UL (ref 0–0.7)
EOSINOPHIL NFR BLD AUTO: 1.7 %
ERYTHROCYTE [DISTWIDTH] IN BLOOD BY AUTOMATED COUNT: 12.6 % (ref 11–15)
EST. AVERAGE GLUCOSE BLD GHB EST-MCNC: 100 MG/DL (ref 68–126)
FASTING PATIENT LIPID ANSWER: YES
FASTING STATUS PATIENT QL REPORTED: YES
GLOBULIN PLAS-MCNC: 2.7 G/DL (ref 2–3.5)
GLUCOSE BLD-MCNC: 95 MG/DL (ref 70–99)
HBA1C MFR BLD: 5.1 % (ref ?–5.7)
HCT VFR BLD AUTO: 41.7 % (ref 35–48)
HDLC SERPL-MCNC: 51 MG/DL (ref 40–59)
HGB BLD-MCNC: 13.7 G/DL (ref 12–16)
IMM GRANULOCYTES # BLD AUTO: 0.01 X10(3) UL (ref 0–1)
IMM GRANULOCYTES NFR BLD: 0.1 %
LDLC SERPL CALC-MCNC: 176 MG/DL (ref ?–100)
LYMPHOCYTES # BLD AUTO: 2.09 X10(3) UL (ref 1–4)
LYMPHOCYTES NFR BLD AUTO: 26.9 %
MCH RBC QN AUTO: 29.8 PG (ref 26–34)
MCHC RBC AUTO-ENTMCNC: 32.9 G/DL (ref 31–37)
MCV RBC AUTO: 90.7 FL (ref 80–100)
MONOCYTES # BLD AUTO: 0.4 X10(3) UL (ref 0.1–1)
MONOCYTES NFR BLD AUTO: 5.1 %
NEUTROPHILS # BLD AUTO: 5.11 X10 (3) UL (ref 1.5–7.7)
NEUTROPHILS # BLD AUTO: 5.11 X10(3) UL (ref 1.5–7.7)
NEUTROPHILS NFR BLD AUTO: 65.7 %
NONHDLC SERPL-MCNC: 212 MG/DL (ref ?–130)
OSMOLALITY SERPL CALC.SUM OF ELEC: 285 MOSM/KG (ref 275–295)
PLATELET # BLD AUTO: 256 10(3)UL (ref 150–450)
POTASSIUM SERPL-SCNC: 4 MMOL/L (ref 3.5–5.1)
PROT SERPL-MCNC: 7.6 G/DL (ref 5.7–8.2)
RBC # BLD AUTO: 4.6 X10(6)UL (ref 3.8–5.3)
SODIUM SERPL-SCNC: 137 MMOL/L (ref 136–145)
T3FREE SERPL-MCNC: 3.24 PG/ML (ref 2.4–4.2)
T4 FREE SERPL-MCNC: 1.4 NG/DL (ref 0.8–1.7)
TRIGL SERPL-MCNC: 195 MG/DL (ref 30–149)
TSI SER-ACNC: 0.77 UIU/ML (ref 0.55–4.78)
VIT D+METAB SERPL-MCNC: 17.6 NG/ML (ref 30–100)
VLDLC SERPL CALC-MCNC: 40 MG/DL (ref 0–30)
WBC # BLD AUTO: 7.8 X10(3) UL (ref 4–11)

## 2025-07-03 PROCEDURE — 83036 HEMOGLOBIN GLYCOSYLATED A1C: CPT

## 2025-07-03 PROCEDURE — 84481 FREE ASSAY (FT-3): CPT

## 2025-07-03 PROCEDURE — 36415 COLL VENOUS BLD VENIPUNCTURE: CPT

## 2025-07-03 PROCEDURE — 80061 LIPID PANEL: CPT

## 2025-07-03 PROCEDURE — 80053 COMPREHEN METABOLIC PANEL: CPT

## 2025-07-03 PROCEDURE — 85025 COMPLETE CBC W/AUTO DIFF WBC: CPT

## 2025-07-03 PROCEDURE — 84443 ASSAY THYROID STIM HORMONE: CPT

## 2025-07-03 PROCEDURE — 82306 VITAMIN D 25 HYDROXY: CPT

## 2025-07-03 PROCEDURE — 84439 ASSAY OF FREE THYROXINE: CPT

## 2025-07-03 NOTE — PROGRESS NOTES
Subjective:     Patient ID: Denisse Collins is a 30 year old female.    Patient presents today for annual physical.  She would complain of having increased anxiety since she has had a new job.  She was asking to get more refills of her Xanax.  She is not taking any other medication for anxiety.  No depression noted.        History/Other:   Review of Systems   Constitutional: Negative.  Negative for fever and unexpected weight change.   HENT: Negative.     Eyes: Negative.    Respiratory: Negative.     Cardiovascular: Negative.  Negative for chest pain, palpitations and leg swelling.        Works out daily    Gastrointestinal: Negative.  Negative for anal bleeding, blood in stool and vomiting.   Genitourinary: Negative.    Allergic/Immunologic: Positive for environmental allergies. Negative for food allergies and immunocompromised state.   Neurological:  Negative for syncope.   Hematological: Negative.    Psychiatric/Behavioral:  Positive for decreased concentration. Negative for self-injury, sleep disturbance and suicidal ideas. The patient is nervous/anxious.      Current Medications[1]  Allergies:Allergies[2]    Past Medical History[3]   Past Surgical History[4]   Family History[5]   Social History: Short Social Hx on File[6]     Objective:   Physical Exam  Constitutional:       General: She is not in acute distress.     Appearance: She is well-developed. She is obese. She is not ill-appearing, toxic-appearing or diaphoretic.   HENT:      Head: Normocephalic and atraumatic.      Right Ear: External ear normal.      Left Ear: External ear normal.      Nose: Nose normal.      Mouth/Throat:      Pharynx: No oropharyngeal exudate.   Eyes:      General:         Right eye: No discharge.         Left eye: No discharge.      Conjunctiva/sclera: Conjunctivae normal.      Pupils: Pupils are equal, round, and reactive to light.   Neck:      Vascular: No JVD.   Cardiovascular:      Rate and Rhythm: Normal rate and regular  rhythm.      Pulses: Normal pulses.      Heart sounds: Normal heart sounds. No murmur heard.  Pulmonary:      Effort: Pulmonary effort is normal. No respiratory distress.      Breath sounds: Normal breath sounds. No wheezing or rales.   Abdominal:      General: Bowel sounds are normal. There is no distension.      Palpations: Abdomen is soft. There is no mass.      Tenderness: There is no abdominal tenderness. There is no guarding or rebound.   Musculoskeletal:         General: No tenderness. Normal range of motion.      Cervical back: Normal range of motion. Rigidity present.      Right lower leg: No edema.      Left lower leg: Edema present.   Lymphadenopathy:      Cervical: No cervical adenopathy.   Skin:     General: Skin is warm and dry.      Findings: No rash.   Neurological:      Mental Status: She is alert and oriented to person, place, and time.         Assessment & Plan:   1. Annual physical exam  Routine labs have been ordered.  The patient sees her gynecologist for Pap smear.  - CBC With Differential With Platelet; Future  - Comp Metabolic Panel (14); Future  - Hemoglobin A1C; Future  - Lipid Panel; Future  - Vitamin D; Future  - Assay, Thyroid Stim Hormone; Future  - Free T3 (Triiodothryronine); Future  - Free T4, (Free Thyroxine); Future    2. Hypercholesteremia  Will check a lipid panel.  Patient has markedly elevated total LDL levels.  Had been advised to repeat before to see cardiologist.    3. Vitamin D deficiency  Check vitamin D level.  - Vitamin D; Future    4. PCOS (polycystic ovarian syndrome)  Taina is a follow-up with her gynecologist who manages her PCOS.    5. Abnormal TSH  Check her TSH free T3 and pT4.  - Assay, Thyroid Stim Hormone; Future  - Free T3 (Triiodothryronine); Future  - Free T4, (Free Thyroxine); Future    6. BMI 31.0-31.9,adult  Patient will continue follow-up with her weight clinic.    7. Anxiety  Plan: Discussed with the patient prefer to put her on SSRIs for treatment of  her anxiety.  I put her on Lexapro 10 mg daily she can take half a tablet for the first 5 days and then go to full dose thereafter.  She denies having any depression.    Orders Placed This Encounter   Procedures    CBC With Differential With Platelet    Comp Metabolic Panel (14)    Hemoglobin A1C    Lipid Panel    Vitamin D    Assay, Thyroid Stim Hormone    Free T3 (Triiodothryronine)    Free T4, (Free Thyroxine)       Meds This Visit:  Requested Prescriptions      No prescriptions requested or ordered in this encounter       Imaging & Referrals:  None            [1]   Current Outpatient Medications   Medication Sig Dispense Refill    ALPRAZolam 0.5 MG Oral Tab Take 1tablet by mouth nightly as need for anxiety 30 tablet 0    glycopyrrolate 1 MG Oral Tab Take 1 tablet (1 mg total) by mouth 3 (three) times daily. (Patient taking differently: Take 3 tablets (3 mg total) by mouth 2 (two) times daily.)      triamcinolone 0.1 % External Cream Apply topically 2 (two) times daily as needed. 60 g 1    dexamethasone 1 MG Oral Tab Take 1 tab po 11pm tonight 1 tablet 0   [2]   Allergies  Allergen Reactions    Penicillins    [3]   Past Medical History:   Anxiety    Eczema    Hypercholesterolemia    PCOS (polycystic ovarian syndrome)   [4] History reviewed. No pertinent surgical history.  [5]   Family History  Problem Relation Age of Onset    Hypertension Father     Lipids Father     No Known Problems Mother     No Known Problems Brother    [6]   Social History  Socioeconomic History    Marital status: Single   Tobacco Use    Smoking status: Never     Passive exposure: Never    Smokeless tobacco: Never   Vaping Use    Vaping status: Former   Substance and Sexual Activity    Alcohol use: Yes     Alcohol/week: 0.0 standard drinks of alcohol     Comment: occasiona    Drug use: No     Comment: takes cbd for her anxeity

## 2025-07-05 RX ORDER — ESCITALOPRAM OXALATE 10 MG/1
10 TABLET ORAL DAILY
Qty: 30 TABLET | Refills: 0 | Status: SHIPPED | OUTPATIENT
Start: 2025-07-05

## 2025-07-05 RX ORDER — GLYCOPYRROLATE 1 MG/1
3 TABLET ORAL 2 TIMES DAILY
Qty: 180 TABLET | Refills: 0 | Status: SHIPPED | OUTPATIENT
Start: 2025-07-05

## 2025-07-07 ENCOUNTER — TELEPHONE (OUTPATIENT)
Dept: INTERNAL MEDICINE CLINIC | Facility: CLINIC | Age: 30
End: 2025-07-07

## 2025-07-07 NOTE — TELEPHONE ENCOUNTER
Fax [employer] Screening Form to fax number [phone]. Confirmation has been received that fax went through successfully. A copy has been left at the  for patient to .

## 2025-07-13 ENCOUNTER — RESULTS FOLLOW-UP (OUTPATIENT)
Dept: INTERNAL MEDICINE CLINIC | Facility: CLINIC | Age: 30
End: 2025-07-13

## 2025-07-13 DIAGNOSIS — E55.9 VITAMIN D DEFICIENCY: Primary | ICD-10-CM

## 2025-07-13 RX ORDER — ERGOCALCIFEROL 1.25 MG/1
50000 CAPSULE, LIQUID FILLED ORAL WEEKLY
Qty: 12 CAPSULE | Refills: 0 | Status: SHIPPED | OUTPATIENT
Start: 2025-07-13 | End: 2025-09-29

## 2025-07-22 DIAGNOSIS — F41.9 ANXIETY: ICD-10-CM

## 2025-07-23 DIAGNOSIS — F41.9 ANXIETY: ICD-10-CM

## 2025-07-25 RX ORDER — ALPRAZOLAM 0.5 MG
TABLET ORAL
Qty: 30 TABLET | Refills: 0 | Status: SHIPPED | OUTPATIENT
Start: 2025-07-25

## 2025-07-25 RX ORDER — ALPRAZOLAM 0.5 MG
TABLET ORAL
Qty: 30 TABLET | Refills: 0 | OUTPATIENT
Start: 2025-07-25

## 2025-08-08 RX ORDER — ESCITALOPRAM OXALATE 10 MG/1
10 TABLET ORAL DAILY
Qty: 90 TABLET | Refills: 1 | Status: SHIPPED | OUTPATIENT
Start: 2025-08-08